# Patient Record
Sex: FEMALE | Race: WHITE
[De-identification: names, ages, dates, MRNs, and addresses within clinical notes are randomized per-mention and may not be internally consistent; named-entity substitution may affect disease eponyms.]

---

## 2017-11-01 ENCOUNTER — HOSPITAL ENCOUNTER (INPATIENT)
Dept: HOSPITAL 92 - L&D | Age: 27
LOS: 3 days | Discharge: HOME | End: 2017-11-04
Attending: FAMILY MEDICINE | Admitting: FAMILY MEDICINE
Payer: COMMERCIAL

## 2017-11-01 VITALS — BODY MASS INDEX: 38.2 KG/M2

## 2017-11-01 DIAGNOSIS — Z3A.39: ICD-10-CM

## 2017-11-01 LAB
HCT VFR BLD CALC: 36.4 % (ref 36–47)
RBC # BLD AUTO: 4.05 MILL/UL (ref 4.2–5.4)
WBC # BLD AUTO: 14.2 THOU/UL (ref 4.8–10.8)

## 2017-11-01 PROCEDURE — 87340 HEPATITIS B SURFACE AG IA: CPT

## 2017-11-01 PROCEDURE — S0020 INJECTION, BUPIVICAINE HYDRO: HCPCS

## 2017-11-01 PROCEDURE — 3E0P3VZ INTRODUCTION OF HORMONE INTO FEMALE REPRODUCTIVE, PERCUTANEOUS APPROACH: ICD-10-PCS | Performed by: FAMILY MEDICINE

## 2017-11-01 PROCEDURE — 85027 COMPLETE CBC AUTOMATED: CPT

## 2017-11-01 PROCEDURE — 86780 TREPONEMA PALLIDUM: CPT

## 2017-11-01 PROCEDURE — 36415 COLL VENOUS BLD VENIPUNCTURE: CPT

## 2017-11-02 PROCEDURE — 10907ZC DRAINAGE OF AMNIOTIC FLUID, THERAPEUTIC FROM PRODUCTS OF CONCEPTION, VIA NATURAL OR ARTIFICIAL OPENING: ICD-10-PCS | Performed by: FAMILY MEDICINE

## 2017-11-02 PROCEDURE — 0KQM0ZZ REPAIR PERINEUM MUSCLE, OPEN APPROACH: ICD-10-PCS | Performed by: FAMILY MEDICINE

## 2017-11-02 PROCEDURE — 3E0P7VZ INTRODUCTION OF HORMONE INTO FEMALE REPRODUCTIVE, VIA NATURAL OR ARTIFICIAL OPENING: ICD-10-PCS | Performed by: FAMILY MEDICINE

## 2017-11-02 RX ADMIN — HYDROCODONE BITARTRATE AND ACETAMINOPHEN PRN TAB: 5; 325 TABLET ORAL at 22:32

## 2017-11-02 RX ADMIN — HYDROCODONE BITARTRATE AND ACETAMINOPHEN PRN TAB: 5; 325 TABLET ORAL at 17:24

## 2017-11-02 RX ADMIN — DOCUSATE CALCIUM SCH MG: 240 CAPSULE, LIQUID FILLED ORAL at 21:53

## 2017-11-02 NOTE — OP
DATE OF DELIVERY 11/02/2017

 

PREOPERATIVE DIAGNOSES:  Term intrauterine pregnancy, elective induction of labor.

 

POSTOPERATIVE DIAGNOSES: Term intrauterine pregnancy, elective induction of labor as well as a secon
d degree perineal laceration.

 

PROCEDURE PERFORMED:  Normal spontaneous vaginal delivery and laceration repair.

 

SURGEON:  Austin Dill M.D.

 

ANESTHESIA:  Epidural.

 

ESTIMATED BLOOD LOSS:  300 mL

 

BRIEF DELIVERY SUMMARY:  This is a 27-year-old G4, P0, at 39-1/7 weeks' gestation, who presented for
 elective induction of labor last night.  She received Cytotec x2 with good response.  She was start
ed on Pitocin this morning and underwent artificial rupture of membranes.  She progressed well to co
mplete and pushing.  She delivered a live female infant, head OA.  Mouth and nares were bulb suction
ed at the perineum.  There was no nuchal cord.  Shoulders and body easily followed.  It is notable t
hat the cord was quite short.  The infant was placed on mother's abdomen.  The umbilical cord was do
ubly clamped and cut and cord blood was sent for analysis.  Infant Apgars were 8 at 1 minute and 9 a
t 5 minutes.  There was a second degree perineal laceration which was repaired in standard running f
ashion using 2-0 Vicryl suture under epidural anesthesia with excellent hemostasis.  Placenta was de
livered spontaneously and intact with a 3-vessel umbilical cord.  This was followed by a gush of blo
od.  This resolved with bimanual massage and evacuation of clot.  Pitocin was hanging in the IV.  Mo
m and baby were left with the nurse in excellent condition attempting to breast feed.

## 2017-11-03 RX ADMIN — DOCUSATE CALCIUM SCH MG: 240 CAPSULE, LIQUID FILLED ORAL at 21:57

## 2017-11-03 RX ADMIN — HYDROCODONE BITARTRATE AND ACETAMINOPHEN PRN TAB: 5; 325 TABLET ORAL at 12:21

## 2017-11-03 RX ADMIN — DOCUSATE CALCIUM SCH MG: 240 CAPSULE, LIQUID FILLED ORAL at 08:22

## 2017-11-03 NOTE — PRG
DATE OF SERVICE:  11/03/2017

 

POSTPARTUM PROGRESS NOTE

 

PRIMARY OBSTETRICIAN:  Dr. Austin Dill M.D.

 

SUBJECTIVE:  The patient is a 27-year-old female who is postpartum day 1, status post a term spontan
eous vaginal delivery.  The patient this morning reports she is ambulating well, tolerating p.o., vo
iding on her own and having decreased lochia.

 

PHYSICAL EXAMINATION:

VITAL SIGNS:  Today, blood pressure is 120/65, temperature is 97.6, pulse of 89, respiratory rate of
 20.

GENERAL:  She appears to be in no acute distress.  She is alert and oriented, and cooperative and pl
easant to interact with.

HEAD:  Normocephalic, atraumatic.

ABDOMEN:  Fundus is firm at the umbilicus -1.

EXTREMITIES:  Nontender with symmetrical edema.

 

ASSESSMENT AND PLAN:  The patient is a 27-year-old female postpartum day 1, status post a term spont
aneous vaginal delivery.  We will continue postpartum care.  Anticipate discharge tomorrow.

## 2017-11-04 VITALS — SYSTOLIC BLOOD PRESSURE: 126 MMHG | DIASTOLIC BLOOD PRESSURE: 75 MMHG | TEMPERATURE: 97.7 F

## 2017-11-04 RX ADMIN — DOCUSATE CALCIUM SCH MG: 240 CAPSULE, LIQUID FILLED ORAL at 09:24

## 2017-11-04 NOTE — DIS
DATE OF ADMISSION:  2017

 

DATE OF DISCHARGE:  2017 (postpartum day #2).

 

LOCATION:  Room #332 on [facility].

 

This is a patient of Dr. Austin Dill.

 

PRIMARY DIAGNOSIS:  Status post vaginal delivery at 39 weeks.

 

HOSPITAL COURSE:  In brief, this is a patient of Dr. Austin Dill who is a  4, now para 1, who
 presented with induction of labor at patient's request.  She underwent Cytotec and Pitocin and unde
rwent spontaneous vaginal birth dated 2017 at 1410.  I evaluated the patient on postpartum day
 #2, which was 2014.  When I evaluated the patient, her vital signs showed a temperature range
 of 97.8-98.1, pulse was in the 70s-90s, respirations were 16-20 unlabored, blood pressure ranged fr
om 130//61.  On laboratory assessment, patient had an initial hepatitis B surface antigen and 
syphilis serologies that were both negative and hematocrit value initially on admission was 36.4.  O
n physical exam, there was no evidence of uterine tenderness, or abnormal vaginal bleeding.  Abdomen
 was soft and nontender.

 

ASSESSMENT:  On postoperative day #2, the patient was clinically stable, status post vaginal deliver
y and was cleared for discharge on 2017.

 

DISCHARGE MEDICATIONS:  Include Motrin 600 mg 1 p.o. q.6 hours p.r.n. pain.

 

DISCHARGE INSTRUCTIONS:  She was told to follow up with Dr. Austin Dill per routine postpartum care.
  There was no evidence of postpartum complication at the time of discharge.